# Patient Record
Sex: MALE | Race: WHITE | NOT HISPANIC OR LATINO | Employment: FULL TIME | URBAN - METROPOLITAN AREA
[De-identification: names, ages, dates, MRNs, and addresses within clinical notes are randomized per-mention and may not be internally consistent; named-entity substitution may affect disease eponyms.]

---

## 2022-04-16 ENCOUNTER — OFFICE VISIT (OUTPATIENT)
Dept: URGENT CARE | Facility: CLINIC | Age: 32
End: 2022-04-16

## 2022-04-16 VITALS
HEART RATE: 67 BPM | BODY MASS INDEX: 30.3 KG/M2 | TEMPERATURE: 97.8 F | HEIGHT: 71 IN | SYSTOLIC BLOOD PRESSURE: 126 MMHG | RESPIRATION RATE: 18 BRPM | DIASTOLIC BLOOD PRESSURE: 76 MMHG | WEIGHT: 216.4 LBS | OXYGEN SATURATION: 94 %

## 2022-04-16 DIAGNOSIS — Z76.0 MEDICATION REFILL: ICD-10-CM

## 2022-04-16 DIAGNOSIS — F32.A DEPRESSION, UNSPECIFIED DEPRESSION TYPE: ICD-10-CM

## 2022-04-16 DIAGNOSIS — F41.9 ANXIETY: ICD-10-CM

## 2022-04-16 PROCEDURE — 99203 OFFICE O/P NEW LOW 30 MIN: CPT | Performed by: NURSE PRACTITIONER

## 2022-04-16 RX ORDER — SERTRALINE HYDROCHLORIDE 100 MG/1
100 TABLET, FILM COATED ORAL DAILY
COMMUNITY
End: 2022-04-16 | Stop reason: SDUPTHER

## 2022-04-16 RX ORDER — SERTRALINE HYDROCHLORIDE 100 MG/1
100 TABLET, FILM COATED ORAL DAILY
Qty: 90 TABLET | Refills: 0 | Status: SHIPPED | OUTPATIENT
Start: 2022-04-16 | End: 2022-12-14 | Stop reason: SDUPTHER

## 2022-04-17 PROBLEM — J35.8 TONSILLAR DEBRIS: Status: ACTIVE | Noted: 2018-12-10

## 2022-04-17 PROBLEM — J30.9 ALLERGIC RHINITIS: Status: ACTIVE | Noted: 2018-12-10

## 2022-04-17 PROBLEM — J34.3 HYPERTROPHY OF BOTH INFERIOR NASAL TURBINATES: Status: ACTIVE | Noted: 2019-11-14

## 2022-04-17 PROBLEM — J34.2 DEVIATED NASAL SEPTUM: Status: ACTIVE | Noted: 2018-12-10

## 2022-04-17 PROBLEM — J35.03 CHRONIC TONSILLITIS AND ADENOIDITIS: Status: ACTIVE | Noted: 2019-11-14

## 2022-04-17 ASSESSMENT — ENCOUNTER SYMPTOMS
NAUSEA: 0
DIZZINESS: 0
SORE THROAT: 0
VOMITING: 0
SHORTNESS OF BREATH: 0
FEVER: 0
EYE PAIN: 0
MYALGIAS: 0
CHILLS: 0

## 2022-04-17 NOTE — PROGRESS NOTES
"Subjective:   Topher England is a 31 y.o. male who presents for Medication Refill (Medication refill)      HPI  Patient is a 31-year-old male who presents the urgent care with a request of medication refill sertraline 100 mg that he takes for anxiety and depression.  Patient states he has been taking medication for some time tolerating dose with no complications.  Patient is without a primary care provider at this time and is about to run out of his prescription.  Patient denies any suicidal or homicidal ideology.  Patient with no other concerns  Review of Systems   Constitutional: Negative for chills and fever.   HENT: Negative for sore throat.    Eyes: Negative for pain.   Respiratory: Negative for shortness of breath.    Cardiovascular: Negative for chest pain.   Gastrointestinal: Negative for nausea and vomiting.   Genitourinary: Negative for hematuria.   Musculoskeletal: Negative for myalgias.   Skin: Negative for rash.   Neurological: Negative for dizziness.       Medications:    • sertraline Tabs    Allergies: Amoxicillin    Problem List: Topher England does not have a problem list on file.    Surgical History:  No past surgical history on file.    Past Social Hx: Topher England  reports that he has never smoked. He has never used smokeless tobacco. He reports previous alcohol use. He reports that he does not use drugs.     Past Family Hx:  Topher England family history is not on file.     Problem list, medications, and allergies reviewed by myself today in Epic.     Objective:     /76 (BP Location: Left arm, Patient Position: Sitting)   Pulse 67   Temp 36.6 °C (97.8 °F) (Temporal)   Resp 18   Ht 1.803 m (5' 11\")   Wt 98.2 kg (216 lb 6.4 oz)   SpO2 94%   BMI 30.18 kg/m²     Physical Exam  Constitutional:       Appearance: Normal appearance. He is not ill-appearing or toxic-appearing.   HENT:      Head: Normocephalic.      Right Ear: External ear normal.      Left Ear: " External ear normal.      Nose: Nose normal.      Mouth/Throat:      Lips: Pink.      Mouth: Mucous membranes are moist.   Eyes:      General: Lids are normal.         Right eye: No discharge.         Left eye: No discharge.   Pulmonary:      Effort: Pulmonary effort is normal. No accessory muscle usage or respiratory distress.   Musculoskeletal:         General: Normal range of motion.      Cervical back: Full passive range of motion without pain.   Skin:     Coloration: Skin is not pale.   Neurological:      Mental Status: He is alert and oriented to person, place, and time.   Psychiatric:         Mood and Affect: Mood normal.         Speech: Speech normal.         Behavior: Behavior normal.         Thought Content: Thought content normal.         Assessment/Plan:     Diagnosis and associated orders:     1. Medication refill  sertraline (ZOLOFT) 100 MG Tab    Referral back to [facility]   2. Depression, unspecified depression type     3. Anxiety        Comments/MDM:     I personally reviewed prior external notes and prior test results pertinent to today's visit.   Discussed management options, risks and benefits, and alternatives to treatment plan agreed upon.   Red flags discussed and indications to immediately call 911 or present to the Emergency Department.   Supportive care, differential diagnoses, and indications for immediate follow-up discussed with patient.    • Patient expresses understanding and agrees to plan. Patient denies any other questions or concerns.   •                Please note that this dictation was created using voice recognition software. I have made a reasonable attempt to correct obvious errors, but I expect that there are errors of grammar and possibly content that I did not discover before finalizing the note.    This note was electronically signed by Julio César FERRERA

## 2022-11-02 DIAGNOSIS — Z76.0 MEDICATION REFILL: ICD-10-CM

## 2022-12-14 ENCOUNTER — OFFICE VISIT (OUTPATIENT)
Dept: MEDICAL GROUP | Facility: MEDICAL CENTER | Age: 32
End: 2022-12-14
Payer: COMMERCIAL

## 2022-12-14 ENCOUNTER — TELEPHONE (OUTPATIENT)
Dept: SCHEDULING | Facility: IMAGING CENTER | Age: 32
End: 2022-12-14

## 2022-12-14 VITALS
WEIGHT: 190.7 LBS | HEIGHT: 71 IN | OXYGEN SATURATION: 97 % | BODY MASS INDEX: 26.7 KG/M2 | TEMPERATURE: 97 F | SYSTOLIC BLOOD PRESSURE: 106 MMHG | DIASTOLIC BLOOD PRESSURE: 66 MMHG | HEART RATE: 96 BPM

## 2022-12-14 DIAGNOSIS — Z76.0 MEDICATION REFILL: ICD-10-CM

## 2022-12-14 DIAGNOSIS — F32.A ANXIETY AND DEPRESSION: ICD-10-CM

## 2022-12-14 DIAGNOSIS — J02.9 SORE THROAT: ICD-10-CM

## 2022-12-14 DIAGNOSIS — F41.9 ANXIETY AND DEPRESSION: ICD-10-CM

## 2022-12-14 PROBLEM — J34.3 HYPERTROPHY OF BOTH INFERIOR NASAL TURBINATES: Status: RESOLVED | Noted: 2019-11-14 | Resolved: 2022-12-14

## 2022-12-14 PROBLEM — J35.8 TONSILLAR DEBRIS: Status: RESOLVED | Noted: 2018-12-10 | Resolved: 2022-12-14

## 2022-12-14 PROBLEM — J35.03 CHRONIC TONSILLITIS AND ADENOIDITIS: Status: RESOLVED | Noted: 2019-11-14 | Resolved: 2022-12-14

## 2022-12-14 PROBLEM — J34.2 DEVIATED NASAL SEPTUM: Status: RESOLVED | Noted: 2018-12-10 | Resolved: 2022-12-14

## 2022-12-14 PROCEDURE — 99214 OFFICE O/P EST MOD 30 MIN: CPT | Performed by: STUDENT IN AN ORGANIZED HEALTH CARE EDUCATION/TRAINING PROGRAM

## 2022-12-14 RX ORDER — SERTRALINE HYDROCHLORIDE 100 MG/1
100 TABLET, FILM COATED ORAL DAILY
Qty: 90 TABLET | Refills: 3 | Status: SHIPPED | OUTPATIENT
Start: 2022-12-14

## 2022-12-14 SDOH — ECONOMIC STABILITY: TRANSPORTATION INSECURITY
IN THE PAST 12 MONTHS, HAS LACK OF RELIABLE TRANSPORTATION KEPT YOU FROM MEDICAL APPOINTMENTS, MEETINGS, WORK OR FROM GETTING THINGS NEEDED FOR DAILY LIVING?: NO

## 2022-12-14 SDOH — ECONOMIC STABILITY: HOUSING INSECURITY: IN THE LAST 12 MONTHS, HOW MANY PLACES HAVE YOU LIVED?: 1

## 2022-12-14 SDOH — HEALTH STABILITY: PHYSICAL HEALTH: ON AVERAGE, HOW MANY MINUTES DO YOU ENGAGE IN EXERCISE AT THIS LEVEL?: 50 MIN

## 2022-12-14 SDOH — HEALTH STABILITY: PHYSICAL HEALTH: ON AVERAGE, HOW MANY DAYS PER WEEK DO YOU ENGAGE IN MODERATE TO STRENUOUS EXERCISE (LIKE A BRISK WALK)?: 5 DAYS

## 2022-12-14 SDOH — ECONOMIC STABILITY: INCOME INSECURITY: IN THE LAST 12 MONTHS, WAS THERE A TIME WHEN YOU WERE NOT ABLE TO PAY THE MORTGAGE OR RENT ON TIME?: NO

## 2022-12-14 SDOH — ECONOMIC STABILITY: HOUSING INSECURITY
IN THE LAST 12 MONTHS, WAS THERE A TIME WHEN YOU DID NOT HAVE A STEADY PLACE TO SLEEP OR SLEPT IN A SHELTER (INCLUDING NOW)?: NO

## 2022-12-14 SDOH — ECONOMIC STABILITY: FOOD INSECURITY: WITHIN THE PAST 12 MONTHS, YOU WORRIED THAT YOUR FOOD WOULD RUN OUT BEFORE YOU GOT MONEY TO BUY MORE.: SOMETIMES TRUE

## 2022-12-14 SDOH — ECONOMIC STABILITY: TRANSPORTATION INSECURITY

## 2022-12-14 SDOH — ECONOMIC STABILITY: FOOD INSECURITY: WITHIN THE PAST 12 MONTHS, THE FOOD YOU BOUGHT JUST DIDN'T LAST AND YOU DIDN'T HAVE MONEY TO GET MORE.: SOMETIMES TRUE

## 2022-12-14 SDOH — ECONOMIC STABILITY: TRANSPORTATION INSECURITY
IN THE PAST 12 MONTHS, HAS LACK OF TRANSPORTATION KEPT YOU FROM MEETINGS, WORK, OR FROM GETTING THINGS NEEDED FOR DAILY LIVING?: NO

## 2022-12-14 SDOH — ECONOMIC STABILITY: INCOME INSECURITY: HOW HARD IS IT FOR YOU TO PAY FOR THE VERY BASICS LIKE FOOD, HOUSING, MEDICAL CARE, AND HEATING?: NOT VERY HARD

## 2022-12-14 SDOH — HEALTH STABILITY: MENTAL HEALTH
STRESS IS WHEN SOMEONE FEELS TENSE, NERVOUS, ANXIOUS, OR CAN'T SLEEP AT NIGHT BECAUSE THEIR MIND IS TROUBLED. HOW STRESSED ARE YOU?: ONLY A LITTLE

## 2022-12-14 ASSESSMENT — SOCIAL DETERMINANTS OF HEALTH (SDOH)
DO YOU BELONG TO ANY CLUBS OR ORGANIZATIONS SUCH AS CHURCH GROUPS UNIONS, FRATERNAL OR ATHLETIC GROUPS, OR SCHOOL GROUPS?: NO
WITHIN THE PAST 12 MONTHS, YOU WORRIED THAT YOUR FOOD WOULD RUN OUT BEFORE YOU GOT THE MONEY TO BUY MORE: SOMETIMES TRUE
HOW OFTEN DO YOU HAVE SIX OR MORE DRINKS ON ONE OCCASION: MONTHLY
HOW OFTEN DO YOU ATTEND CHURCH OR RELIGIOUS SERVICES?: NEVER
HOW OFTEN DO YOU ATTENT MEETINGS OF THE CLUB OR ORGANIZATION YOU BELONG TO?: 1 TO 4 TIMES PER YEAR
HOW HARD IS IT FOR YOU TO PAY FOR THE VERY BASICS LIKE FOOD, HOUSING, MEDICAL CARE, AND HEATING?: NOT VERY HARD
HOW MANY DRINKS CONTAINING ALCOHOL DO YOU HAVE ON A TYPICAL DAY WHEN YOU ARE DRINKING: 1 OR 2
ARE YOU MARRIED, WIDOWED, DIVORCED, SEPARATED, NEVER MARRIED, OR LIVING WITH A PARTNER?: NEVER MARRIED
HOW OFTEN DO YOU HAVE A DRINK CONTAINING ALCOHOL: 2-4 TIMES A MONTH
HOW OFTEN DO YOU ATTENT MEETINGS OF THE CLUB OR ORGANIZATION YOU BELONG TO?: 1 TO 4 TIMES PER YEAR
HOW OFTEN DO YOU GET TOGETHER WITH FRIENDS OR RELATIVES?: MORE THAN THREE TIMES A WEEK
DO YOU BELONG TO ANY CLUBS OR ORGANIZATIONS SUCH AS CHURCH GROUPS UNIONS, FRATERNAL OR ATHLETIC GROUPS, OR SCHOOL GROUPS?: NO
ARE YOU MARRIED, WIDOWED, DIVORCED, SEPARATED, NEVER MARRIED, OR LIVING WITH A PARTNER?: NEVER MARRIED
HOW OFTEN DO YOU ATTEND CHURCH OR RELIGIOUS SERVICES?: NEVER
IN A TYPICAL WEEK, HOW MANY TIMES DO YOU TALK ON THE PHONE WITH FAMILY, FRIENDS, OR NEIGHBORS?: MORE THAN THREE TIMES A WEEK
HOW OFTEN DO YOU GET TOGETHER WITH FRIENDS OR RELATIVES?: MORE THAN THREE TIMES A WEEK
IN A TYPICAL WEEK, HOW MANY TIMES DO YOU TALK ON THE PHONE WITH FAMILY, FRIENDS, OR NEIGHBORS?: MORE THAN THREE TIMES A WEEK

## 2022-12-14 ASSESSMENT — LIFESTYLE VARIABLES
SKIP TO QUESTIONS 9-10: 0
HOW MANY STANDARD DRINKS CONTAINING ALCOHOL DO YOU HAVE ON A TYPICAL DAY: 1 OR 2
AUDIT-C TOTAL SCORE: 4
HOW OFTEN DO YOU HAVE A DRINK CONTAINING ALCOHOL: 2-4 TIMES A MONTH
HOW OFTEN DO YOU HAVE SIX OR MORE DRINKS ON ONE OCCASION: MONTHLY

## 2022-12-14 ASSESSMENT — PATIENT HEALTH QUESTIONNAIRE - PHQ9
5. POOR APPETITE OR OVEREATING: 1 - SEVERAL DAYS
SUM OF ALL RESPONSES TO PHQ QUESTIONS 1-9: 7
CLINICAL INTERPRETATION OF PHQ2 SCORE: 2

## 2022-12-14 NOTE — PROGRESS NOTES
"Subjective:     Chief Complaint   Patient presents with    Establish Care     Prior PCP None    Depression     Sertraline refill         HPI:   Topher presents today to establish care.  No previous PCP.    Establish care  Patient presents today to establish care.  Patient notes previous medical diagnosis of anxiety/depression.  Patient notes no other medical diagnoses.  Patient with previous tonsillectomy and sinus surgery.  Patient notes no significant family history.  No tobacco use or concern for alcohol/marijuana use.  Patient works for [employer].  Patient states he gets regular exercise and eats a well-balanced diet.  Patient notes no concerns about sleep.    Sore throat  Patient notes that he has had a sore throat for a few days.  Patient notes that it is painful to swallow.  Patient has tried over-the-counter cold medicine with no relief.  Patient notes that he has taken 5 COVID tests all negative.  Patient denies any other symptoms, just sore throat.    Anxiety and depression  Patient with history of chronic anxiety and depression.  Patient denies anxiety and depression today.  Patient denies SI and HI.  Patient notes that sertraline 100 mg works well to control his symptoms.  Patient has no new concerns.  Patient is aware that he should titrate off of this medication instead of stopping immediately.  Patient will continue on sertraline 100 mg.    ROS:  Gen: no fevers/chills  Pulm: no sob, no cough  CV: no chest pain, no palpitations  GI: no nausea/vomiting, no diarrhea        Objective:     Exam:  /66 (BP Location: Right arm, Patient Position: Sitting, BP Cuff Size: Adult)   Pulse 96   Temp 36.1 °C (97 °F) (Temporal)   Ht 1.803 m (5' 11\")   Wt 86.5 kg (190 lb 11.2 oz)   SpO2 97%   BMI 26.60 kg/m²  Body mass index is 26.6 kg/m².    Gen: Alert and oriented, No apparent distress.  Neck: Neck is supple without lymphadenopathy.  Lungs: Normal effort, CTA bilaterally, no wheezes, rhonchi, or " rales  CV: Regular rate and rhythm. No murmurs, rubs, or gallops.  Ext: No clubbing, cyanosis, edema.    Assessment & Plan:     32 y.o. male with the following -     1. Anxiety and depression  2. Medication refill  Chronic, stable.  Patient has been on sertraline 100 mg.  Patient notes that he is stable on this medication.  Patient provided refills today.  - sertraline (ZOLOFT) 100 MG Tab; Take 1 Tablet by mouth every day.  Dispense: 90 Tablet; Refill: 3    3. Sore throat  CT strep negative.  Patient advised of results.  Patient encouraged to continue treating symptomatically.  Discussed signs and symptoms that would require follow-up.  - POCT Rapid Strep A       No follow-ups on file.    Please note that this dictation was created using voice recognition software. I have made every reasonable attempt to correct obvious errors, but I expect that there are errors of grammar and possibly content that I did not discover before finalizing the note.

## 2023-01-31 RX ORDER — SERTRALINE HYDROCHLORIDE 100 MG/1
100 TABLET, FILM COATED ORAL DAILY
Qty: 90 TABLET | Refills: 0 | OUTPATIENT
Start: 2023-01-31

## 2023-12-01 ENCOUNTER — NON-PROVIDER VISIT (OUTPATIENT)
Dept: MEDICAL GROUP | Facility: MEDICAL CENTER | Age: 33
End: 2023-12-01
Payer: COMMERCIAL

## 2023-12-01 DIAGNOSIS — Z23 NEED FOR VACCINATION: ICD-10-CM

## 2023-12-01 PROCEDURE — 90686 IIV4 VACC NO PRSV 0.5 ML IM: CPT | Performed by: NURSE PRACTITIONER

## 2023-12-01 PROCEDURE — 90471 IMMUNIZATION ADMIN: CPT | Performed by: NURSE PRACTITIONER

## 2023-12-01 PROCEDURE — 90746 HEPB VACCINE 3 DOSE ADULT IM: CPT | Performed by: NURSE PRACTITIONER

## 2023-12-01 PROCEDURE — 90472 IMMUNIZATION ADMIN EACH ADD: CPT | Performed by: NURSE PRACTITIONER

## 2023-12-01 PROCEDURE — 90715 TDAP VACCINE 7 YRS/> IM: CPT | Performed by: NURSE PRACTITIONER

## 2023-12-01 PROCEDURE — 90716 VAR VACCINE LIVE SUBQ: CPT | Performed by: NURSE PRACTITIONER

## 2023-12-01 NOTE — PROGRESS NOTES
"Topher England is a 33 y.o. male here for a non-provider visit for:   FLU  HEPATITIS B 1 of 3  TDAP  VARICELLA (Chicken Pox) 1 of 1    Reason for immunization: needed for work/school  Immunization records indicate need for vaccine: Yes, confirmed with Epic  Minimum interval has been met for this vaccine: Yes  ABN completed: Not Indicated    VIS Dated  12/01/23 was given to patient: Yes  All IAC Questionnaire questions were answered \"No.\"    Patient tolerated injection and no adverse effects were observed or reported: Yes    Pt scheduled for next dose in series: Not Indicated  "

## 2023-12-05 ENCOUNTER — APPOINTMENT (OUTPATIENT)
Dept: MEDICAL GROUP | Facility: MEDICAL CENTER | Age: 33
End: 2023-12-05
Payer: COMMERCIAL

## 2023-12-06 ENCOUNTER — APPOINTMENT (OUTPATIENT)
Dept: MEDICAL GROUP | Facility: MEDICAL CENTER | Age: 33
End: 2023-12-06
Payer: COMMERCIAL

## 2023-12-07 ENCOUNTER — APPOINTMENT (OUTPATIENT)
Dept: MEDICAL GROUP | Facility: MEDICAL CENTER | Age: 33
End: 2023-12-07
Payer: COMMERCIAL

## 2023-12-29 ENCOUNTER — NON-PROVIDER VISIT (OUTPATIENT)
Dept: MEDICAL GROUP | Facility: MEDICAL CENTER | Age: 33
End: 2023-12-29
Payer: COMMERCIAL

## 2023-12-29 DIAGNOSIS — Z23 NEED FOR VACCINATION: ICD-10-CM

## 2023-12-29 PROCEDURE — 90716 VAR VACCINE LIVE SUBQ: CPT | Performed by: FAMILY MEDICINE

## 2023-12-29 PROCEDURE — 90472 IMMUNIZATION ADMIN EACH ADD: CPT | Performed by: FAMILY MEDICINE

## 2023-12-29 PROCEDURE — 90471 IMMUNIZATION ADMIN: CPT | Performed by: FAMILY MEDICINE

## 2023-12-29 PROCEDURE — 90746 HEPB VACCINE 3 DOSE ADULT IM: CPT | Performed by: FAMILY MEDICINE

## 2023-12-29 NOTE — PROGRESS NOTES
"Topher England is a 33 y.o. male here for a non-provider visit for:   HEPATITIS B 2 of 3  VARICELLA (Chicken Pox) 2 of 2    Reason for immunization: continue or complete series started at the office  Immunization records indicate need for vaccine: Yes, confirmed with Epic  Minimum interval has been met for this vaccine: Yes  ABN completed: Not Indicated    VIS Dated  8/6/21, 8/6/21 was given to patient: Yes  All IAC Questionnaire questions were answered \"No.\"    Patient tolerated injection and no adverse effects were observed or reported: Yes    Pt scheduled for next dose in series: No   "

## 2024-01-12 ENCOUNTER — DOCUMENTATION (OUTPATIENT)
Dept: HEALTH INFORMATION MANAGEMENT | Facility: OTHER | Age: 34
End: 2024-01-12
Payer: COMMERCIAL

## 2024-03-08 ENCOUNTER — TELEPHONE (OUTPATIENT)
Dept: HEALTH INFORMATION MANAGEMENT | Facility: OTHER | Age: 34
End: 2024-03-08
Payer: COMMERCIAL

## 2024-06-11 ENCOUNTER — OFFICE VISIT (OUTPATIENT)
Dept: MEDICAL GROUP | Facility: PHYSICIAN GROUP | Age: 34
End: 2024-06-11

## 2024-06-11 VITALS
BODY MASS INDEX: 26.88 KG/M2 | SYSTOLIC BLOOD PRESSURE: 118 MMHG | HEART RATE: 75 BPM | WEIGHT: 192 LBS | OXYGEN SATURATION: 97 % | HEIGHT: 71 IN | RESPIRATION RATE: 14 BRPM | DIASTOLIC BLOOD PRESSURE: 80 MMHG | TEMPERATURE: 97 F

## 2024-06-11 DIAGNOSIS — F41.9 ANXIETY AND DEPRESSION: ICD-10-CM

## 2024-06-11 DIAGNOSIS — F32.A ANXIETY AND DEPRESSION: ICD-10-CM

## 2024-06-11 PROCEDURE — 3074F SYST BP LT 130 MM HG: CPT | Performed by: PHYSICIAN ASSISTANT

## 2024-06-11 PROCEDURE — 99213 OFFICE O/P EST LOW 20 MIN: CPT | Performed by: PHYSICIAN ASSISTANT

## 2024-06-11 PROCEDURE — 3079F DIAST BP 80-89 MM HG: CPT | Performed by: PHYSICIAN ASSISTANT

## 2024-06-11 RX ORDER — SERTRALINE HYDROCHLORIDE 100 MG/1
100 TABLET, FILM COATED ORAL DAILY
Qty: 90 TABLET | Refills: 3 | Status: SHIPPED | OUTPATIENT
Start: 2024-06-11

## 2024-06-11 ASSESSMENT — PATIENT HEALTH QUESTIONNAIRE - PHQ9: CLINICAL INTERPRETATION OF PHQ2 SCORE: 0

## 2024-06-11 NOTE — PROGRESS NOTES
"CC:    Chief Complaint   Patient presents with    Establish Care    Medication Refill     sertraline       HISTORY OF THE PRESENT ILLNESS: Patient is a 33 y.o. male presenting because he is needing medication refill. Not wanting to establish PCP.     Pt needing refill of zoloft for anxiety and depression. Working well.         No problem-specific Assessment & Plan notes found for this encounter.    Allergies: Amoxicillin    Current Outpatient Medications Ordered in Epic   Medication Sig Dispense Refill    sertraline (ZOLOFT) 100 MG Tab Take 1 Tablet by mouth every day. 90 Tablet 3     No current Epic-ordered facility-administered medications on file.       Past Medical History:   Diagnosis Date    Anxiety     Depression        Past Surgical History:   Procedure Laterality Date    TONSILLECTOMY AND ADENOIDECTOMY         Social History     Tobacco Use    Smoking status: Never    Smokeless tobacco: Never   Vaping Use    Vaping status: Former   Substance Use Topics    Alcohol use: Yes     Alcohol/week: 0.6 - 1.2 oz     Types: 1 - 2 Cans of beer per week    Drug use: Yes     Types: Inhaled, Marijuana     Comment: occasionally       Social History     Social History Narrative    Not on file       No family history on file.    ROS:       - Psychiatric/Behavioral: Positive for anxiety and depression. Negative for suicidal/homicidal ideation and memory loss.            .      Exam: /80   Pulse 75   Temp 36.1 °C (97 °F) (Temporal)   Resp 14   Ht 1.803 m (5' 11\")   Wt 87.1 kg (192 lb)   SpO2 97%  Body mass index is 26.78 kg/m².    General: Normal appearing. No acute distress.  Skin: Warm and dry.  No obvious lesions.  Neurologic: Grossly nonfocal, A&O x3, gait normal,  Musculoskeletal: No deformity or swelling.   Extremities: No extremity cyanosis, clubbing, or edema.  Psych: Normal mood and affect. Judgment and insight is normal.    Please note that this dictation was created using voice recognition software. I " have made every reasonable attempt to correct obvious errors, but I expect that there are errors of grammar and possibly content that I did not discover before finalizing the note.      Assessment/Plan  1. Anxiety and depression  Refill sent in  - sertraline (ZOLOFT) 100 MG Tab; Take 1 Tablet by mouth every day.  Dispense: 90 Tablet; Refill: 3

## 2024-11-07 ENCOUNTER — APPOINTMENT (OUTPATIENT)
Dept: RADIOLOGY | Facility: IMAGING CENTER | Age: 34
End: 2024-11-07
Payer: COMMERCIAL

## 2024-11-07 ENCOUNTER — OCCUPATIONAL MEDICINE (OUTPATIENT)
Dept: URGENT CARE | Facility: CLINIC | Age: 34
End: 2024-11-07
Payer: COMMERCIAL

## 2024-11-07 VITALS
WEIGHT: 176 LBS | OXYGEN SATURATION: 96 % | RESPIRATION RATE: 16 BRPM | HEART RATE: 72 BPM | SYSTOLIC BLOOD PRESSURE: 108 MMHG | BODY MASS INDEX: 25.2 KG/M2 | DIASTOLIC BLOOD PRESSURE: 78 MMHG | TEMPERATURE: 98 F | HEIGHT: 70 IN

## 2024-11-07 DIAGNOSIS — S49.92XA INJURY OF LEFT SHOULDER, INITIAL ENCOUNTER: ICD-10-CM

## 2024-11-07 DIAGNOSIS — S19.9XXA INJURY OF NECK, INITIAL ENCOUNTER: ICD-10-CM
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
## 2024-11-07 DIAGNOSIS — S16.1XXA CERVICAL STRAIN, ACUTE, INITIAL ENCOUNTER: ICD-10-CM

## 2024-11-07 PROCEDURE — 99214 OFFICE O/P EST MOD 30 MIN: CPT

## 2024-11-07 PROCEDURE — 72040 X-RAY EXAM NECK SPINE 2-3 VW: CPT | Mod: TC | Performed by: RADIOLOGY

## 2024-11-07 PROCEDURE — 73030 X-RAY EXAM OF SHOULDER: CPT | Mod: TC,LT | Performed by: RADIOLOGY

## 2024-11-07 RX ORDER — METHYLPREDNISOLONE 4 MG/1
TABLET ORAL
Qty: 21 TABLET | Refills: 0 | Status: SHIPPED | OUTPATIENT
Start: 2024-11-07

## 2024-11-07 NOTE — LETTER
PHYSICIAN’S AND CHIROPRACTIC PHYSICIAN'S   PROGRESS REPORT   CERTIFICATION OF DISABILITY Claim Number:     Social Security Number:    Patient’s Name: Topher England Date of Injury: 10/25/2024   Employer:   Name of MCO (if applicable):      Patient’s Job Description/Occupation:        Previous Injuries/Diseases/Surgeries Contributing to the Condition:   None       Diagnosis: (S49.92XA) Injury of left shoulder, initial encounter  (S19.9XXA) Injury of neck, initial encounter  (Y99.0) Work related injury  (S16.1XXA) Cervical strain, acute, initial encounter      Related to the Industrial Injury?   Yes    Explain:  Patient was exercising at work when he felt immediate pain in his left shoulder and posterior neck region.        Objective Medical Findings:  Tenderness to palpation to posterior left cervical musculature.   Strength 5/5 to bilateral upper extremities.   No midline tenderness.       Cervical ROM intact.  No sign of impingement in left arm. No left shoulder instability indicating dislocation. No neurovascular compromise to left upper extremity. Left shoulder abduction and forward flexion slightly limited due to pain. Distal neurovascular intact.          None - Discharged                      X   Stable                    Ratable           Generally Improved                         Condition Worsened                  Condition Same  May Have Suffered a Permanent Disability  No      Treatment Plan:     Medrol dosepak  Tylenol  Ice/heat application  Work restrictions  FU 6 days           No Change in Therapy                  PT/OT Prescribed                      Medication May be Used While Working        Case Management                          PT/OT Discontinued    Consultation    Further Diagnostic Studies:    Prescription(s)       DX cervical spine     - negative   DX left shoulder     - negative        Medrol dosepak      Released to FULL DUTY /No Restrictions on (Date):        Certified TOTALLY TEMPORARILY DISABLED (Indicate Dates) From:   To:      Released to RESTRICTED/Modified Duty on (Date): From:  11/7/24 To:  11/14/24.   Restrictions Are:   Temporary       No Sitting    No Standing   X No Pulling Other:  No use of left shoulder/arm       X No Bending at Waist   X  No Stooping   X  No Lifting      X  No Carrying     No Walking Lifting Restricted to (lbs.):   <10 pounds       X No Pushing       X No Climbing   X  No Reaching Above Shoulders       Date of Next Visit:   11/13/24.  Date of this Exam: 11/7/2024 Physician/Chiropractic Physician Name: DEDRICK Dang Physician/Chiropractic Physician Signature:  Victorino Spangler DO [facility]:  [address] [place] [address], [address] [place], [place] [address] - Telephone [phone] [facility]:  [address] [place] [address], [address] [place], [place] [address] - Telephone [phone]    [email or web address]  D-39 (Rev. 10/24)

## 2024-11-07 NOTE — LETTER
"    EMPLOYEE’S CLAIM FOR COMPENSATION/ REPORT OF INITIAL TREATMENT  FORM C-4  PLEASE TYPE OR PRINT    EMPLOYEE’S CLAIM - PROVIDE ALL INFORMATION REQUESTED   First Name                    AMANDA Obando                  Last Name  Tomás Birthdate                    1990                Sex  Male Claim Number (Insurer’s Use Only)     Home Address  [address] Age  34 y.o. Height  1.778 m (5' 10\") Weight  79.8 kg (176 lb) Social Security Number     [facility] [place] Zip  [address] Telephone  [phone] (home)    Mailing Address  [address] [place] Zip  [address] Primary Language Spoken  English    INSURER   THIRD-PARTY   State Compensation Ins Fund   Employee's Occupation (Job Title) When Injury or Occupational Disease Occurred      Employer's Name/Company Name     Telephone  [phone]    Office Mail Address (Number and Street)  [address] [place] [address]     Date of Injury (if applicable) 10/25/2024               Hours Injury (if applicable)  10:30 AM Date Employer Notified  10/25/2024 Last Day of Work after Injury or Occupational Disease  11/4/2024 Supervisor to Whom Injury Reported  Gilbert Hawkins   Address or Location of Accident (if applicable)  Work [1]   What were you doing at the time of accident? (if applicable)  working out    How did this injury or occupational disease occur? (Be specific and answer in detail. Use additional sheet if necessary)  I was doing a shoulder & back work out & felt pain. And progressively got worse   If you believe that you have an occupational disease, when did you first have knowledge of the disability and its relationship to your employment?  N/A Witnesses to the Accident (if applicable)  Viraj Jacome      Nature of Injury or Occupational Disease  Strain  Part(s) of Body Injured or Affected  Shoulder (L) Soft Tissue - Neck N/A    I " CERTIFY THAT THE ABOVE IS TRUE AND CORRECT TO T HE BEST OF MY KNOWLEDGE AND THAT I HAVE PROVIDED THIS INFORMATION IN ORDER TO OBTAIN THE BENEFITS OF [place] OR [place]  I HEREBY AUTHORIZE ANY PHYSICIAN, CHIROPRACTOR, SURGEON, PRACTITIONER OR ANY OTHER PERSON, ANY HOSPITAL, INCLUDING [facility] OR [facility], ANY  MEDICAL SERVICE ORGANIZATION, ANY INSURANCE COMPANY, OR OTHER INSTITUTION OR ORGANIZATION TO RELEASE TO EACH OTHER, ANY MEDICAL OR OTHER INFORMATION, INCLUDING BENEFITS PAID OR PAYABLE, PERTINENT TO THIS INJURY OR DISEASE, EXCEPT INFORMATION RELATIVE TO DIAGNOSIS, TREATMENT AND/OR COUNSELING FOR AIDS, PSYCHOLOGICAL CONDITIONS, ALCOHOL OR CONTROLLED SUBSTANCES, FOR WHICH I MUST GIVE SPECIFIC AUTHORIZATION.  A PHOTOSTAT OF THIS AUTHORIZATION SHALL BE VALID AS THE ORIGINAL.     Date   Place Employee’s Original or  *Electronic Signature   THIS REPORT MUST BE COMPLETED AND MAILED WITHIN 3 WORKING DAYS OF TREATMENT   Place  [facility]    Name of Facility  [facility]   Date 11/7/2024 Diagnosis and Description of Injury or Occupational Disease  (S49.92XA) Injury of left shoulder, initial encounter  (S19.9XXA) Injury of neck, initial encounter  (Y99.0) Work related injury  Diagnoses of Injury of left shoulder, initial encounter, Injury of neck, initial encounter, and Work related injury were pertinent to this visit. Is there evidence that the injured employee was under the influence of alcohol and/or another controlled substance at the time of accident?  [x]No  [] Yes (if yes, please explain)   Hour 3:55 PM      Treatment:  Medrol dosepak  Tylenol  Ice/heat application  Work restrictions  FU 6 days       Have you advised the patient to remain off work five days or more?   [] Yes Indicate dates: From   To    [x] No      If no, is the injured employee capable of: [] full duty [x]  modified duty                     If modified duty, specify any limitations / restrictions:    See D39 for restrictions                                                                                                                                                                                                                                                                                                                                                                                                               X-Ray Findings:  Negative     From information given by the employee, together with medical evidence, can you directly connect this injury or occupational disease as job incurred?  [x]Yes   [] No      Is additional medical care by a physician indicated? [x]Yes [] No       Do you know of any previous injury or disease contributing to this condition or occupational disease? []Yes [x] No (Explain if yes)                              Date  11/7/2024 Print Health Care Provider’s Name  EDDRICK Dang I certify that the employer’s copy of  this form was delivered to the employer on:   Address  [address] INSURER'S USE ONLY                       [facility] [facility] Zip  [address] Provider’s Tax ID Number  [id number]   Telephone  Dept: [phone]    Health Care Provider’s Original or Electronic Signature    Degree (MD,DO, DC,PA-C,APRN)  APRN  Choose (if applicable)      ORIGINAL - TREATING HEALTHCARE PROVIDER PAGE 2 - INSURER/TPA PAGE 3 - EMPLOYER PAGE 4 - EMPLOYEE             Form C-4 (rev.08/23)

## 2024-11-08 ASSESSMENT — VISUAL ACUITY: OU: 1

## 2024-11-08 NOTE — PROGRESS NOTES
"Subjective:     Topher England is a 34 y.o. male who presents for Workers Comp neck and shoulder injury.     HPI:   DOI: 10/25/24. DEEPA: Patient reports he was at work while exercising and felt immediate posterior left cervical pain. Patient reports pain radiates to left shoulder. Pain is described as dull, constant, and sharp when aggravated by movement and turning the head. Denies numbness/tingling or sensation loss in extremities. No swelling or ecchymosis. Denies fever or shortness of breath. Denies previous injury to neck or left upper extremity. He has attempted Tylenol and ibuprofen for relief.      PMH:   No pertinent past medical history to this problem  MEDS:  Medications were reviewed in EMR  ALLERGIES:  Allergies were reviewed in EMR  [employer]CH[employer]:  Works as a    FH:   No pertinent family history to this problem     Objective:     /78 (BP Location: Right arm, Patient Position: Sitting, BP Cuff Size: Adult)   Pulse 72   Temp 36.7 °C (98 °F) (Temporal)   Resp 16   Ht 1.778 m (5' 10\")   Wt 79.8 kg (176 lb)   SpO2 96%   BMI 25.25 kg/m²     Physical Exam  Vitals reviewed.   Constitutional:       General: He is not in acute distress.  HENT:      Head: Normocephalic and atraumatic.      Nose: Nose normal.   Eyes:      General: Vision grossly intact. Gaze aligned appropriately. No visual field deficit.     Extraocular Movements: Extraocular movements intact.      Conjunctiva/sclera: Conjunctivae normal.      Pupils: Pupils are equal, round, and reactive to light.      Right eye: Pupil is round, reactive and not sluggish.      Left eye: Pupil is round, reactive and not sluggish.      Funduscopic exam:     Right eye: Red reflex present.         Left eye: Red reflex present.  Neck:      Comments: Tenderness to palpation to posterior left cervical musculature.   Strength 5/5 to bilateral upper extremities.   No midline tenderness.       Cervical ROM intact.   Distal neurovascular intact.   "   Cardiovascular:      Rate and Rhythm: Normal rate and regular rhythm.      Pulses: Normal pulses.           Radial pulses are 2+ on the right side and 2+ on the left side.      Heart sounds: Normal heart sounds.   Pulmonary:      Effort: Pulmonary effort is normal. No tachypnea, accessory muscle usage, prolonged expiration, respiratory distress or retractions.      Breath sounds: Normal breath sounds. No stridor, decreased air movement or transmitted upper airway sounds. No wheezing, rhonchi or rales.   Musculoskeletal:         General: Tenderness present. No swelling or deformity.      Right shoulder: Normal.      Left shoulder: Tenderness present. No swelling, deformity, bony tenderness or crepitus. Normal range of motion. Normal strength. Normal pulse.      Right upper arm: Normal.      Left upper arm: No swelling, deformity, tenderness or bony tenderness.      Right elbow: Normal.      Left elbow: No swelling, deformity or effusion. Normal range of motion. No tenderness.      Cervical back: Neck supple. Tenderness present. No erythema, rigidity or crepitus. Pain with movement and muscular tenderness present. No spinous process tenderness. Decreased range of motion.      Comments: No sign of impingement in left arm. No left shoulder instability indicating dislocation. No neurovascular compromise to left upper extremity. Left shoulder abduction and forward flexion slightly limited due to pain. Distal neurovascular intact.     Lymphadenopathy:      Cervical: No cervical adenopathy.   Skin:     General: Skin is warm and dry.      Capillary Refill: Capillary refill takes less than 2 seconds.      Findings: No bruising, erythema or rash.   Neurological:      Mental Status: He is alert. Mental status is at baseline.      Sensory: Sensation is intact.      Motor: Motor function is intact.      Coordination: Coordination is intact.      Gait: Gait is intact.   Psychiatric:         Mood and Affect: Mood normal.          Behavior: Behavior normal.         Thought Content: Thought content normal.        DX-CERVICAL SPINE-2 OR 3 VIEWS    Result Date: 11/7/2024 11/7/2024 5:05 PM HISTORY/REASON FOR EXAM:  Pain Following Trauma; Left posterior cervical pain following injury. TECHNIQUE/EXAM DESCRIPTION AND NUMBER OF VIEWS: Cervical spine series, 3 views. COMPARISON:  None. FINDINGS: The cervical vertebral bodies are normal in height and alignment. Disk spaces are maintained. There is no facet arthropathy. The lung apices are clear.     Negative cervical spine series    DX-SHOULDER 2+ LEFT    Result Date: 11/7/2024 11/7/2024 4:57 PM HISTORY/REASON FOR EXAM:  Pain/Deformity Following Trauma. TECHNIQUE/EXAM DESCRIPTION AND NUMBER OF VIEWS:  3 views of the LEFT shoulder. COMPARISON: None FINDINGS: There is no fracture. Alignment is normal. No degenerative changes are present. Visualized lung is clear.     Negative left shoulder series      Assessment/Plan:     1. Injury of left shoulder, initial encounter  - DX-SHOULDER 2+ LEFT; Future    2. Injury of neck, initial encounter  - DX-CERVICAL SPINE-2 OR 3 VIEWS; Future    3. Work related injury    4. Cervical strain, acute, initial encounter  - methylPREDNISolone (MEDROL DOSEPAK) 4 MG Tablet Therapy Pack; Follow schedule on package instructions.  Dispense: 21 Tablet; Refill: 0    Released to RESTRICTED Duty FROM 11/7/24 TO 11/13/24.   Restrictions: No bending at waist, carrying, pushing, stooping, climbing, pulling, lifting, reaching above shoulders. No use of left arm. Weight limit <10 pounds.    History and examination with reproducible tenderness to palpation of posterior left cervical musculature consistent with acute strain of neck muscle. No red flag/alarm features present that warrant immediate higher level of care or further work up. Neurological examination without abnormality.     Work restrictions placed. Follow up in 6 days.       Discussed with patient signs and symptoms  consistent with a neck strain. Patient advised on how to correctly take Medrol dosepak.    Treatment of as above and initial rest with no heavy lifting, stooping, or strenuous activity. Massage, ice and/or heat which ever feels better, and Tylenol per manufacture's instructions. Encouraged walking, stretching, and range of motion exercises as tolerated. Avoid sitting or laying down for long periods of time except for at night during sleep.   Patient is overall very well-appearing, no acute distress, and normal vital signs. Suspicions for acute emergent pathology are low.     Illness progression and alarm symptoms discussed with patient, emphasizing low threshold for returning to clinic/emergency department for worsening symptoms. Patient is agreeable to the plan and verbalizes understanding, and will follow up if warranted.       Differential diagnosis, natural history, supportive care, and indications for immediate follow-up discussed.       Follow-up as needed if symptoms worsen or fail to improve to PCP, Urgent care or Emergency Room.                         Electronically signed by MARLEN Hawkins

## 2024-11-13 ENCOUNTER — OCCUPATIONAL MEDICINE (OUTPATIENT)
Dept: URGENT CARE | Facility: CLINIC | Age: 34
End: 2024-11-13
Payer: COMMERCIAL

## 2024-11-13 VITALS
BODY MASS INDEX: 25.11 KG/M2 | WEIGHT: 175.4 LBS | HEART RATE: 79 BPM | SYSTOLIC BLOOD PRESSURE: 114 MMHG | DIASTOLIC BLOOD PRESSURE: 72 MMHG | HEIGHT: 70 IN | RESPIRATION RATE: 16 BRPM | TEMPERATURE: 96.8 F | OXYGEN SATURATION: 97 %

## 2024-11-13 DIAGNOSIS — S16.1XXD CERVICAL STRAIN, ACUTE, SUBSEQUENT ENCOUNTER: ICD-10-CM
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
## 2024-11-13 DIAGNOSIS — S49.92XD INJURY OF LEFT SHOULDER, SUBSEQUENT ENCOUNTER: ICD-10-CM

## 2024-11-13 DIAGNOSIS — S19.9XXD INJURY OF NECK, SUBSEQUENT ENCOUNTER: ICD-10-CM

## 2024-11-13 PROCEDURE — 99213 OFFICE O/P EST LOW 20 MIN: CPT | Performed by: PHYSICIAN ASSISTANT

## 2024-11-13 PROCEDURE — 3074F SYST BP LT 130 MM HG: CPT | Performed by: PHYSICIAN ASSISTANT

## 2024-11-13 PROCEDURE — 3078F DIAST BP <80 MM HG: CPT | Performed by: PHYSICIAN ASSISTANT

## 2024-11-13 NOTE — LETTER
[facility]   PROGRESS REPORT   CERTIFICATION OF DISABILITY Claim Number:     Social Security Number:    Patient’s Name: Topher England Date of Injury: 10/25/2024   Employer:   Name of [employer] (if applicable):      Patient’s Job Description/Occupation:        Previous Injuries/Diseases/Surgeries Contributing to the Condition:         Diagnosis: (S49.92XD) Injury of left shoulder, subsequent encounter  (S19.9XXD) Injury of neck, subsequent encounter  (Y99.0) Work related injury  (S16.1XXD) Cervical strain, acute, subsequent encounter      Related to the Industrial Injury? Yes     Explain: DOI: 10/25/24. DEEPA: Patient reports he was at work while exercising and felt immediate posterior left cervical pain.    First follow-up visit:  Previous cervical spine and left shoulder x-rays reviewed and were negative, please recall patient was also started on Medrol Dosepak.  He feels 100% symptom improvement at this time.  Does feel ready to return to full duty and ready to be discharged      Objective Medical Findings: Examination of the left-sided cervical spine is negative for muscle tenderness.  No bony midline tenderness.  No tenderness over the left-sided periscapular musculature or upper trapezius muscle belly.  Left shoulder range of motion within normal limits and nonpainful, cervical spine range of motion within normal limits and nonpainful         None - Discharged                         Stable  No                 Ratable  No     X   Generally Improved                         Condition Worsened                  Condition Same  May Have Suffered a Permanent Disability No     Treatment Plan:    Discharge MMI         No Change in Therapy                  PT/OT Prescribed                      Medication May be Used While Working        Case Management                          PT/OT Discontinued    Consultation    Further Diagnostic Studies:    Prescription(s)               X   Released to FULL DUTY /No Restrictions on (Date):  11/13/2024    Certified TOTALLY TEMPORARILY DISABLED (Indicate Dates) From:   To:      Released to RESTRICTED/Modified Duty on (Date): From:   To:    Restrictions Are:  Permanent      No Sitting    No Standing    No Pulling Other: Discharge MMI       No Bending at Waist     No Stooping     No Lifting        No Carrying     No Walking Lifting Restricted to (lbs.):          No Pushing        No Climbing     No Reaching Above Shoulders       Date of Next Visit:    Date of this Exam: 11/13/2024 Physician/Chiropractic Physician Name: Ye Black P.A.-C. Physician/Chiropractic Physician Signature:  Victorino Spangler DO MPH     [place]:  [address] [place] [address], [address] [place], [place] [address] - Telephone [phone] [place]:  [address] [place] [address], [address] [place], [place] [address] - Telephone [phone]    [email or web address]  D-39 (Rev. 10/24)

## 2024-11-13 NOTE — PROGRESS NOTES
"Subjective:     Topher England is a 34 y.o. male who presents for Work-Related Injury ((L) shoulder injury )         DOI: 10/25/24. DEEPA: Patient reports he was at work while exercising and felt immediate posterior left cervical pain.    First follow-up visit:  Previous cervical spine and left shoulder x-rays reviewed and were negative, please recall patient was also started on Medrol Dosepak.  He feels 100% symptom improvement at this time.  Does feel ready to return to full duty and ready to be discharged    PMH:   No pertinent past medical history to this problem  MEDS:  Medications were reviewed in EMR  ALLERGIES:  Allergies were reviewed in EMR  [employer]:  Works as a   FH:   No pertinent family history to this problem       Objective:     /72 (BP Location: Left arm, Patient Position: Sitting, BP Cuff Size: Adult long)   Pulse 79   Temp 36 °C (96.8 °F) (Temporal)   Resp 16   Ht 1.778 m (5' 10\")   Wt 79.6 kg (175 lb 6.4 oz)   SpO2 97%   BMI 25.17 kg/m²     Examination of the left-sided cervical spine is negative for muscle tenderness.  No bony midline tenderness.  No tenderness over the left-sided periscapular musculature or upper trapezius muscle belly.  Left shoulder range of motion within normal limits and nonpainful, cervical spine range of motion within normal limits and nonpainful      Diagnostic:  None    Assessment/Plan:     Encounter Diagnoses   Name Primary?    Injury of left shoulder, subsequent encounter     Injury of neck, subsequent encounter     Work related injury     Cervical strain, acute, subsequent encounter          Plan:  Discharge MMI.  Visit from 11/7 reviewed, cervical spine x-rays, left shoulder x-ray also reviewed.    Differential diagnosis, natural history, supportive care, and indications for immediate follow-up discussed.    Ye Black PA-C    "

## 2025-08-02 ENCOUNTER — OFFICE VISIT (OUTPATIENT)
Dept: URGENT CARE | Facility: CLINIC | Age: 35
End: 2025-08-02

## 2025-08-02 VITALS
SYSTOLIC BLOOD PRESSURE: 106 MMHG | TEMPERATURE: 97 F | DIASTOLIC BLOOD PRESSURE: 78 MMHG | HEART RATE: 64 BPM | HEIGHT: 72 IN | OXYGEN SATURATION: 98 % | WEIGHT: 186.6 LBS | BODY MASS INDEX: 25.27 KG/M2 | RESPIRATION RATE: 16 BRPM

## 2025-08-02 DIAGNOSIS — Z02.89 ENCOUNTER FOR EXAMINATION REQUIRED BY DEPARTMENT OF TRANSPORTATION (DOT): Primary | ICD-10-CM

## 2025-08-02 PROCEDURE — 7100 PR DOT PHYSICAL: Performed by: FAMILY MEDICINE
